# Patient Record
Sex: MALE | Race: OTHER | HISPANIC OR LATINO | URBAN - METROPOLITAN AREA
[De-identification: names, ages, dates, MRNs, and addresses within clinical notes are randomized per-mention and may not be internally consistent; named-entity substitution may affect disease eponyms.]

---

## 2022-01-08 ENCOUNTER — EMERGENCY (EMERGENCY)
Facility: HOSPITAL | Age: 52
LOS: 1 days | Discharge: HOME | End: 2022-01-08
Attending: EMERGENCY MEDICINE
Payer: MEDICARE

## 2022-01-08 VITALS
RESPIRATION RATE: 20 BRPM | TEMPERATURE: 99 F | HEART RATE: 83 BPM | OXYGEN SATURATION: 95 % | DIASTOLIC BLOOD PRESSURE: 74 MMHG | SYSTOLIC BLOOD PRESSURE: 114 MMHG

## 2022-01-08 VITALS
OXYGEN SATURATION: 95 % | WEIGHT: 199.96 LBS | TEMPERATURE: 101 F | HEART RATE: 111 BPM | DIASTOLIC BLOOD PRESSURE: 85 MMHG | RESPIRATION RATE: 22 BRPM | SYSTOLIC BLOOD PRESSURE: 154 MMHG

## 2022-01-08 LAB
ALBUMIN SERPL ELPH-MCNC: 2.6 G/DL — LOW (ref 3.5–5)
ALP SERPL-CCNC: 156 U/L — HIGH (ref 40–120)
ALT FLD-CCNC: 77 U/L DA — HIGH (ref 10–60)
ANION GAP SERPL CALC-SCNC: 6 MMOL/L — SIGNIFICANT CHANGE UP (ref 5–17)
AST SERPL-CCNC: 62 U/L — HIGH (ref 10–40)
BASOPHILS # BLD AUTO: 0.01 K/UL — SIGNIFICANT CHANGE UP (ref 0–0.2)
BASOPHILS NFR BLD AUTO: 0.2 % — SIGNIFICANT CHANGE UP (ref 0–2)
BILIRUB SERPL-MCNC: 0.4 MG/DL — SIGNIFICANT CHANGE UP (ref 0.2–1.2)
BUN SERPL-MCNC: 16 MG/DL — SIGNIFICANT CHANGE UP (ref 7–18)
CALCIUM SERPL-MCNC: 8.2 MG/DL — LOW (ref 8.4–10.5)
CHLORIDE SERPL-SCNC: 109 MMOL/L — HIGH (ref 96–108)
CO2 SERPL-SCNC: 25 MMOL/L — SIGNIFICANT CHANGE UP (ref 22–31)
CREAT SERPL-MCNC: 1.02 MG/DL — SIGNIFICANT CHANGE UP (ref 0.5–1.3)
CRP SERPL-MCNC: 127 MG/L — HIGH
D DIMER BLD IA.RAPID-MCNC: 231 NG/ML DDU — HIGH
EOSINOPHIL # BLD AUTO: 0.01 K/UL — SIGNIFICANT CHANGE UP (ref 0–0.5)
EOSINOPHIL NFR BLD AUTO: 0.2 % — SIGNIFICANT CHANGE UP (ref 0–6)
FERRITIN SERPL-MCNC: 825 NG/ML — HIGH (ref 30–400)
GLUCOSE SERPL-MCNC: 111 MG/DL — HIGH (ref 70–99)
HCT VFR BLD CALC: 45.4 % — SIGNIFICANT CHANGE UP (ref 39–50)
HGB BLD-MCNC: 15.4 G/DL — SIGNIFICANT CHANGE UP (ref 13–17)
IMM GRANULOCYTES NFR BLD AUTO: 0.5 % — SIGNIFICANT CHANGE UP (ref 0–1.5)
LYMPHOCYTES # BLD AUTO: 0.96 K/UL — LOW (ref 1–3.3)
LYMPHOCYTES # BLD AUTO: 15.1 % — SIGNIFICANT CHANGE UP (ref 13–44)
MCHC RBC-ENTMCNC: 28.1 PG — SIGNIFICANT CHANGE UP (ref 27–34)
MCHC RBC-ENTMCNC: 33.9 GM/DL — SIGNIFICANT CHANGE UP (ref 32–36)
MCV RBC AUTO: 82.7 FL — SIGNIFICANT CHANGE UP (ref 80–100)
MONOCYTES # BLD AUTO: 0.34 K/UL — SIGNIFICANT CHANGE UP (ref 0–0.9)
MONOCYTES NFR BLD AUTO: 5.4 % — SIGNIFICANT CHANGE UP (ref 2–14)
NEUTROPHILS # BLD AUTO: 5 K/UL — SIGNIFICANT CHANGE UP (ref 1.8–7.4)
NEUTROPHILS NFR BLD AUTO: 78.6 % — HIGH (ref 43–77)
NRBC # BLD: 0 /100 WBCS — SIGNIFICANT CHANGE UP (ref 0–0)
PLATELET # BLD AUTO: 220 K/UL — SIGNIFICANT CHANGE UP (ref 150–400)
POTASSIUM SERPL-MCNC: 3.4 MMOL/L — LOW (ref 3.5–5.3)
POTASSIUM SERPL-SCNC: 3.4 MMOL/L — LOW (ref 3.5–5.3)
PROCALCITONIN SERPL-MCNC: 0.2 NG/ML — HIGH (ref 0.02–0.1)
PROT SERPL-MCNC: 6.7 G/DL — SIGNIFICANT CHANGE UP (ref 6–8.3)
RBC # BLD: 5.49 M/UL — SIGNIFICANT CHANGE UP (ref 4.2–5.8)
RBC # FLD: 12.1 % — SIGNIFICANT CHANGE UP (ref 10.3–14.5)
SARS-COV-2 RNA SPEC QL NAA+PROBE: DETECTED
SODIUM SERPL-SCNC: 140 MMOL/L — SIGNIFICANT CHANGE UP (ref 135–145)
WBC # BLD: 6.35 K/UL — SIGNIFICANT CHANGE UP (ref 3.8–10.5)
WBC # FLD AUTO: 6.35 K/UL — SIGNIFICANT CHANGE UP (ref 3.8–10.5)

## 2022-01-08 PROCEDURE — 96374 THER/PROPH/DIAG INJ IV PUSH: CPT

## 2022-01-08 PROCEDURE — 93010 ELECTROCARDIOGRAM REPORT: CPT

## 2022-01-08 PROCEDURE — 99285 EMERGENCY DEPT VISIT HI MDM: CPT | Mod: 25

## 2022-01-08 PROCEDURE — 99285 EMERGENCY DEPT VISIT HI MDM: CPT

## 2022-01-08 PROCEDURE — 36415 COLL VENOUS BLD VENIPUNCTURE: CPT

## 2022-01-08 PROCEDURE — 85025 COMPLETE CBC W/AUTO DIFF WBC: CPT

## 2022-01-08 PROCEDURE — 93005 ELECTROCARDIOGRAM TRACING: CPT

## 2022-01-08 PROCEDURE — 71045 X-RAY EXAM CHEST 1 VIEW: CPT | Mod: 26

## 2022-01-08 PROCEDURE — 85379 FIBRIN DEGRADATION QUANT: CPT

## 2022-01-08 PROCEDURE — 84145 PROCALCITONIN (PCT): CPT

## 2022-01-08 PROCEDURE — 87635 SARS-COV-2 COVID-19 AMP PRB: CPT

## 2022-01-08 PROCEDURE — 82728 ASSAY OF FERRITIN: CPT

## 2022-01-08 PROCEDURE — 80053 COMPREHEN METABOLIC PANEL: CPT

## 2022-01-08 PROCEDURE — 96361 HYDRATE IV INFUSION ADD-ON: CPT

## 2022-01-08 PROCEDURE — 86140 C-REACTIVE PROTEIN: CPT

## 2022-01-08 PROCEDURE — 71045 X-RAY EXAM CHEST 1 VIEW: CPT

## 2022-01-08 RX ORDER — SODIUM CHLORIDE 9 MG/ML
1000 INJECTION INTRAMUSCULAR; INTRAVENOUS; SUBCUTANEOUS ONCE
Refills: 0 | Status: COMPLETED | OUTPATIENT
Start: 2022-01-08 | End: 2022-01-08

## 2022-01-08 RX ORDER — ONDANSETRON 8 MG/1
4 TABLET, FILM COATED ORAL ONCE
Refills: 0 | Status: COMPLETED | OUTPATIENT
Start: 2022-01-08 | End: 2022-01-08

## 2022-01-08 RX ORDER — ACETAMINOPHEN 500 MG
650 TABLET ORAL ONCE
Refills: 0 | Status: COMPLETED | OUTPATIENT
Start: 2022-01-08 | End: 2022-01-08

## 2022-01-08 RX ADMIN — Medication 650 MILLIGRAM(S): at 17:16

## 2022-01-08 RX ADMIN — Medication 650 MILLIGRAM(S): at 18:53

## 2022-01-08 RX ADMIN — SODIUM CHLORIDE 1000 MILLILITER(S): 9 INJECTION INTRAMUSCULAR; INTRAVENOUS; SUBCUTANEOUS at 18:53

## 2022-01-08 RX ADMIN — ONDANSETRON 4 MILLIGRAM(S): 8 TABLET, FILM COATED ORAL at 18:52

## 2022-01-08 RX ADMIN — SODIUM CHLORIDE 1000 MILLILITER(S): 9 INJECTION INTRAMUSCULAR; INTRAVENOUS; SUBCUTANEOUS at 17:16

## 2022-01-08 NOTE — ED PROVIDER NOTE - CLINICAL SUMMARY MEDICAL DECISION MAKING FREE TEXT BOX
Patient with fever, cough, vomiting, diarrhea. Concern for COVID. Will check labs, give tylenol and zofran, reassess.

## 2022-01-08 NOTE — ED PROVIDER NOTE - PATIENT PORTAL LINK FT
You can access the FollowMyHealth Patient Portal offered by [facility] by registering at the following website: [email or web address]. By joining [employer]’s FollowMyHealth portal, you will also be able to view your health information using other applications (apps) compatible with our system.

## 2022-01-08 NOTE — ED ADULT NURSE NOTE - OBJECTIVE STATEMENT
pt is here for shortness of breath.  pt stated that exposure covid 19 from his wife, c/o shortness of breath, body ache, cough, denied chest pain,

## 2022-01-08 NOTE — ED PROVIDER NOTE - PROGRESS NOTE DETAILS
patient signed out to me pending lab result. vital stable. covid+ but saturation stable. given return precaution, instructed to monitor for hypoxia from home and obtain pulse ox. return precaution instructed

## 2022-01-08 NOTE — ED PROVIDER NOTE - OBJECTIVE STATEMENT
Patient reports 3 days of cough, shortness of breath, fever, headache, nausea, vomiting, diarrhea. Vomit NBNB. No cp, ap.

## 2022-08-18 NOTE — ED ADULT NURSE NOTE - PRIMARY CARE PROVIDER
Patient is returning call, please see message below.     [employer] Response: Call vs. Reply to [employer] Message  Callback Number: [phone]    Best Availability: anytime  Can A Detailed Message Be Left? yes  Did you confirm the message with the caller?: yes    Thank you,  Demi Aguilar    
TRANSITIONAL CARE MANAGEMENT - HOSPITAL DISCHARGE FOLLOW-UP    Contacted Mr. Camargo regarding follow-up for small bowel obstructio after hospital discharge. He was discharged from the hospital on 8/16/22. Review of the After Visit Summary from the recent hospitalization indicates that the patient needs to Follow up with PCP in 1 weekin 1 week(s)   .    He feels that he is doing well at home.   His diet concern is none. Overall, the patient is eating well. Low residue diet, pt states he is taking a soft food diet, he pays attention to what foods trigger an obstruction and adjusts his diet accordingly.   Ambulation: staying the same  Fever: is not present  Pain: he is experiencing pain in the abdomen. The pain is perceived as moderate (4-6 pain scale) Has residual pain intermittent, allows it to resovle on its own.  Activities of Daily Living (global): Self-care   Patient states that he does have sufficient family support. He feels that he is able to ask for assistance when needed.     Additional patient/family concerns: None .    Discharge medications were verified with the patient. He is fully compliant with the medication regimen prescribed at the time of discharge. He reports that he is not experiencing any medication side effects.    Upon discharge, the patient was to receive no additional services.     Advance Directive:   The patient has the following documents: none.    Patient has an appointment on 8/19/22 with Eliz Khalil MD. Mr. Camargo was reminded about the importance of keeping this appointment.     
TRANSITIONAL CARE MANAGEMENT - HOSPITAL DISCHARGE FOLLOW-UP    Contacted Mr. Camargo regarding follow-up for small bowel obstruction after hospital discharge. He was discharged from the hospital on 8/16/22. Review of the After Visit Summary from the recent hospitalization indicates that the patient needs to NA.    Attempted to reach patient. No answer. Left message for patient to call our clinic at [phone].    
· [ZENAIDA GREENWOOD] ......... Discharge Date: [8/16/2022] ......... [facility] Facility: [[facility]] ......... Disposition: [Home or Self Care] ......... LACE Score: [11] ......... Primary Diagnosis: [] ......... Admission Diagnosis: [Small bowel obstruction     TCM Appt scheduled for 8/19 with PCP  
unknown